# Patient Record
Sex: MALE | Race: WHITE | Employment: FULL TIME | URBAN - METROPOLITAN AREA
[De-identification: names, ages, dates, MRNs, and addresses within clinical notes are randomized per-mention and may not be internally consistent; named-entity substitution may affect disease eponyms.]

---

## 2022-08-15 ENCOUNTER — HOSPITAL ENCOUNTER (EMERGENCY)
Age: 22
Discharge: HOME | End: 2022-08-15
Attending: EMERGENCY MEDICINE
Payer: COMMERCIAL

## 2022-08-15 VITALS
HEART RATE: 61 BPM | OXYGEN SATURATION: 100 % | RESPIRATION RATE: 18 BRPM | TEMPERATURE: 98 F | WEIGHT: 145 LBS | DIASTOLIC BLOOD PRESSURE: 62 MMHG | SYSTOLIC BLOOD PRESSURE: 110 MMHG

## 2022-08-15 DIAGNOSIS — T63.441A BEE STING REACTION, ACCIDENTAL OR UNINTENTIONAL, INITIAL ENCOUNTER: Primary | ICD-10-CM

## 2022-08-15 PROCEDURE — 6370000000 HC RX 637 (ALT 250 FOR IP): Performed by: EMERGENCY MEDICINE

## 2022-08-15 PROCEDURE — 99284 EMERGENCY DEPT VISIT MOD MDM: CPT

## 2022-08-15 PROCEDURE — 93005 ELECTROCARDIOGRAM TRACING: CPT

## 2022-08-15 PROCEDURE — 6360000002 HC RX W HCPCS: Performed by: EMERGENCY MEDICINE

## 2022-08-15 PROCEDURE — 96372 THER/PROPH/DIAG INJ SC/IM: CPT

## 2022-08-15 RX ORDER — DIPHENHYDRAMINE HYDROCHLORIDE 50 MG/ML
50 INJECTION INTRAMUSCULAR; INTRAVENOUS ONCE
Status: COMPLETED | OUTPATIENT
Start: 2022-08-15 | End: 2022-08-15

## 2022-08-15 RX ORDER — DEXAMETHASONE 4 MG/1
8 TABLET ORAL ONCE
Status: COMPLETED | OUTPATIENT
Start: 2022-08-15 | End: 2022-08-15

## 2022-08-15 RX ORDER — FAMOTIDINE 20 MG/1
20 TABLET, FILM COATED ORAL ONCE
Status: COMPLETED | OUTPATIENT
Start: 2022-08-15 | End: 2022-08-15

## 2022-08-15 RX ORDER — DIPHENHYDRAMINE HCL 25 MG
25 CAPSULE ORAL EVERY 6 HOURS PRN
Qty: 20 CAPSULE | Refills: 0 | Status: SHIPPED | OUTPATIENT
Start: 2022-08-15

## 2022-08-15 RX ADMIN — FAMOTIDINE 20 MG: 20 TABLET, FILM COATED ORAL at 11:52

## 2022-08-15 RX ADMIN — DIPHENHYDRAMINE HYDROCHLORIDE 50 MG: 50 INJECTION INTRAMUSCULAR; INTRAVENOUS at 11:52

## 2022-08-15 RX ADMIN — DEXAMETHASONE 8 MG: 4 TABLET ORAL at 11:52

## 2022-08-15 ASSESSMENT — ENCOUNTER SYMPTOMS
SORE THROAT: 0
VOICE CHANGE: 0
COUGH: 0
EYE PAIN: 0
DIARRHEA: 0
STRIDOR: 0
BACK PAIN: 0
CONSTIPATION: 0
TROUBLE SWALLOWING: 0
CHOKING: 0
COLOR CHANGE: 1
FACIAL SWELLING: 0
ABDOMINAL PAIN: 0
VOMITING: 0
WHEEZING: 0
CHEST TIGHTNESS: 0
EYE REDNESS: 0
SHORTNESS OF BREATH: 0
EYE DISCHARGE: 0
SINUS PRESSURE: 0
BLOOD IN STOOL: 0

## 2022-08-15 NOTE — ED TRIAGE NOTES
Complains of a wasp sting to right hand about an hour ago. Complains of chest pressure. Resp even and unlabored. Skin PWD. Localized reaction to right index finger.

## 2022-08-15 NOTE — Clinical Note
Poncho Hines was seen and treated in our emergency department on 8/15/2022. He may return to work on 08/17/2022. If you have any questions or concerns, please don't hesitate to call.       Marci Bojorquez MD

## 2022-08-15 NOTE — ED PROVIDER NOTES
[facility] ED  eMERGENCY dEPARTMENT eNCOUnter      Pt Name: Madelyne Habermann  MRN: [id number]  [place] 2000  Date of evaluation: 8/15/2022  Provider: Ryan Chino MD    CHIEF COMPLAINT       Chief Complaint   Patient presents with    Insect Bite     STORY OF PRESENT ILLNESS   (Location/Symptom, Timing/Onset,Context/Setting, Quality, Duration, Modifying Factors, Severity)  Note limiting factors. Madelyne Habermann is a 24 y.o. male who presents to the emergency department patient bitten by a bee to the right ring finger 40 minutes ago patient has no anaphylactic reaction at this time compared to last time and required EpiPen no swelling or tongue no rash elsewhere some swelling in the right index finger    HPI    NursingNotes were reviewed. REVIEW OF SYSTEMS    (2-9 systems for level 4, 10 or more for level 5)     Review of Systems   Constitutional: Negative. Negative for activity change and fever. HENT:  Negative for congestion, drooling, facial swelling, mouth sores, nosebleeds, sinus pressure, sore throat, trouble swallowing and voice change. Eyes:  Negative for pain, discharge, redness and visual disturbance. Respiratory:  Negative for cough, choking, chest tightness, shortness of breath, wheezing and stridor. Cardiovascular:  Negative for chest pain, palpitations and leg swelling. Gastrointestinal:  Negative for abdominal pain, blood in stool, constipation, diarrhea and vomiting. Endocrine: Negative for cold intolerance, polyphagia and polyuria. Genitourinary:  Negative for dysuria, flank pain, frequency, genital sores and urgency. Musculoskeletal:  Negative for back pain, joint swelling, neck pain and neck stiffness. Skin:  Positive for color change and rash. Negative for pallor. Neurological:  Negative for tremors, seizures, syncope, weakness, numbness and headaches. Hematological:  Negative for adenopathy. Does not bruise/bleed easily.    Psychiatric/Behavioral: Negative for agitation, behavioral problems, hallucinations and sleep disturbance. The patient is not hyperactive. All other systems reviewed and are negative. Except as noted above the remainder of the review of systems was reviewed and negative. PAST MEDICAL HISTORY     Past Medical History:   Diagnosis Date    Bee sting allergy     Crohn disease ([address]          SURGICALHISTORY     History reviewed. No pertinent surgical history. CURRENT MEDICATIONS       Previous Medications    No medications on file       ALLERGIES     Bee venom    FAMILY HISTORY     History reviewed. No pertinent family history. SOCIAL HISTORY       Social History     Socioeconomic History    Marital status: Single     Spouse name: None    Number of children: None    Years of education: None    Highest education level: None   Tobacco Use    Smoking status: Never   Substance and Sexual Activity    Alcohol use: Not Currently    Drug use: Not Currently       SCREENINGS    Jada Coma Scale  Eye Opening: Spontaneous  Best Verbal Response: Oriented  Best Motor Response: Obeys commands  Jada Coma Scale Score: 15 @FLOW([id number])@      PHYSICAL EXAM    (up to 7 for level 4, 8 or more for level 5)     ED Triage Vitals [08/15/22 1144]   BP Temp Temp Source Heart Rate Resp SpO2 Height Weight   132/81 98 °F (36.7 °C) Oral 69 18 100 % -- 145 lb (65.8 kg)       Physical Exam  Vitals and nursing note reviewed. Constitutional:       General: He is not in acute distress. Appearance: Normal appearance. He is well-developed. He is not ill-appearing, toxic-appearing or diaphoretic. Comments:  cooperative patient talks in full sentences no acute distress ambulatory otherwise   HENT:      Head: Normocephalic. Right Ear: Tympanic membrane, ear canal and external ear normal.      Left Ear: Tympanic membrane, ear canal and external ear normal.      Nose: Rhinorrhea present. No congestion.       Mouth/Throat:      Mouth: Mucous membranes are moist.   Eyes:      Extraocular Movements: Extraocular movements intact. Pupils: Pupils are equal, round, and reactive to light. Neck:      Vascular: No carotid bruit. Cardiovascular:      Rate and Rhythm: Normal rate and regular rhythm. Pulses: Normal pulses. Heart sounds: Normal heart sounds. No murmur heard. No gallop. Pulmonary:      Effort: No respiratory distress. Breath sounds: Normal breath sounds. No stridor. No wheezing or rhonchi. Abdominal:      General: Bowel sounds are normal. There is no distension. Palpations: Abdomen is soft. There is no mass. Tenderness: There is no abdominal tenderness. There is no guarding or rebound. Hernia: No hernia is present. Musculoskeletal:         General: Swelling and tenderness present. Normal range of motion. Cervical back: Neck supple. No rigidity or tenderness. Lymphadenopathy:      Cervical: No cervical adenopathy. Skin:     General: Skin is warm. Capillary Refill: Capillary refill takes less than 2 seconds. Coloration: Skin is not jaundiced. Findings: Erythema and rash present. No bruising or lesion. Comments: Attention given to the right index finger patient has distal phalanx on the dorsum injured bite with mild erythema good range of motion no necrotic area cap refill less than 2-second distal phalanx no other rash noted   Neurological:      Mental Status: He is alert and oriented to person, place, and time. Cranial Nerves: No cranial nerve deficit. Sensory: No sensory deficit. Motor: No abnormal muscle tone. Coordination: Coordination normal.      Gait: Gait normal.   Psychiatric:         Behavior: Behavior normal.         Thought Content:  Thought content normal.       DIAGNOSTIC RESULTS     EKG: All EKG's are interpreted by the Emergency Department Physician who either signs or Co-signsthis chart in the absence of a cardiologist.        RADIOLOGY: Non-plain filmimages such as CT, Ultrasound and MRI are read by the radiologist. Plain radiographic images are visualized and preliminarily interpreted by the emergency physician with the below findings:        Interpretation per the Radiologist below, if available at the time ofthis note:    No orders to display         ED BEDSIDE ULTRASOUND:   Performed by ED Physician - none    LABS:  Labs Reviewed - No data to display    All other labs were within normal range or not returned as of this dictation. EMERGENCY DEPARTMENT COURSE and DIFFERENTIAL DIAGNOSIS/MDM:   Vitals:    Vitals:    08/15/22 1144   BP: 132/81   Pulse: 69   Resp: 18   Temp: 98 °F (36.7 °C)   TempSrc: Oral   SpO2: 100%   Weight: 145 lb (65.8 kg)       MDM  Number of Diagnoses or Management Options  Bee sting reaction, accidental or unintentional, initial encounter: established and improving  Diagnosis management comments: Patient had a history of an athletic rash in the past at this time patient has a rash localized to the right index finger patient is observed in the emergency for more than half an hour time no spread of the rash feeling better at this time neurovascular intact EKG performed initially ordered by the nurse because of the discomfort of the chest sinus bradycardia rate of 58/min. Intervals 126/min. Duration 96/min QT interval 382 ms P seen ischemia no ST elevation EKG        CRITICAL CARE TIME   Total Critical Care time was  minutes, excluding separately reportableprocedures. There was a high probability of clinicallysignificant/life threatening deterioration in the patient's condition which required my urgent intervention. NSULTS:  None    PROCEDURES:  Unless otherwise noted below, none     Procedures    FINAL IMPRESSION      1.  Bee sting reaction, accidental or unintentional, initial encounter          DISPOSITION/PLAN   DISPOSITION Discharge - Pending Orders Complete 08/15/2022 12:04:44 PM      PATIENT REFERRED TO:  Micheal Lyle Carm[address] [facility]  [phone]    As needed      DISCHARGE MEDICATIONS:  New Prescriptions    DIPHENHYDRAMINE (BENADRYL) 25 MG CAPSULE    Take 1 capsule by mouth every 6 hours as needed for Itching or Allergies    HYDROCORTISONE 1 % CREAM    Apply topically 2 -3 times daily for 5 - 7 days.           (Please note that portions of this note were completed with a voice recognition program.  Efforts were made to edit the dictations but occasionally words are mis-transcribed.)    Tushar Perez MD (electronically signed)  Attending Emergency Physician        Tushar Perez MD  08/15/22 24 [id number]

## 2022-08-17 LAB
EKG ATRIAL RATE: 58 BPM
EKG P AXIS: 12 DEGREES
EKG P-R INTERVAL: 126 MS
EKG Q-T INTERVAL: 382 MS
EKG QRS DURATION: 96 MS
EKG QTC CALCULATION (BAZETT): 374 MS
EKG R AXIS: 86 DEGREES
EKG T AXIS: 77 DEGREES
EKG VENTRICULAR RATE: 58 BPM

## 2023-11-02 PROBLEM — Z86.19 HISTORY OF CMV: Status: ACTIVE | Noted: 2023-11-02

## 2023-11-02 PROBLEM — K50.90 CROHN'S DISEASE (MULTI): Status: ACTIVE | Noted: 2023-11-02

## 2023-11-02 PROBLEM — R19.7 DIARRHEA: Status: ACTIVE | Noted: 2023-11-02

## 2023-11-02 PROBLEM — R63.4 WEIGHT LOSS: Status: ACTIVE | Noted: 2023-11-02

## 2023-11-02 PROBLEM — R04.0 LEFT-SIDED EPISTAXIS: Status: ACTIVE | Noted: 2023-11-02

## 2023-11-02 PROBLEM — R10.84 GENERALIZED ABDOMINAL PAIN: Status: ACTIVE | Noted: 2023-11-02

## 2023-11-02 RX ORDER — BUDESONIDE 3 MG/1
CAPSULE, COATED PELLETS ORAL
COMMUNITY

## 2024-10-01 ENCOUNTER — OFFICE VISIT (OUTPATIENT)
Dept: URGENT CARE | Age: 24
End: 2024-10-01
Payer: COMMERCIAL

## 2024-10-01 VITALS
RESPIRATION RATE: 16 BRPM | TEMPERATURE: 98.2 F | SYSTOLIC BLOOD PRESSURE: 134 MMHG | WEIGHT: 150 LBS | HEIGHT: 73 IN | BODY MASS INDEX: 19.88 KG/M2 | HEART RATE: 68 BPM | OXYGEN SATURATION: 97 % | DIASTOLIC BLOOD PRESSURE: 87 MMHG

## 2024-10-01 DIAGNOSIS — Z23 ENCOUNTER FOR IMMUNIZATION: ICD-10-CM

## 2024-10-01 DIAGNOSIS — S61.412A LACERATION OF LEFT HAND WITHOUT FOREIGN BODY, INITIAL ENCOUNTER: Primary | ICD-10-CM

## 2024-10-01 RX ORDER — CEPHALEXIN 500 MG/1
500 CAPSULE ORAL 2 TIMES DAILY
Qty: 14 CAPSULE | Refills: 0 | Status: SHIPPED | OUTPATIENT
Start: 2024-10-01 | End: 2024-10-08

## 2024-10-01 ASSESSMENT — PATIENT HEALTH QUESTIONNAIRE - PHQ9
SUM OF ALL RESPONSES TO PHQ9 QUESTIONS 1 AND 2: 0
2. FEELING DOWN, DEPRESSED OR HOPELESS: NOT AT ALL
1. LITTLE INTEREST OR PLEASURE IN DOING THINGS: NOT AT ALL

## 2024-10-01 ASSESSMENT — ENCOUNTER SYMPTOMS: WOUND: 1

## 2024-10-01 NOTE — PATIENT INSTRUCTIONS
- Discussed wound care  - Advised on s/s to seek emergent care for  - Oral antibiotics started; please make sure to complete all of the antibiotics  - Keep wound clean and dry; when steri-strips starts to come off, clip edges only

## 2024-10-01 NOTE — PROGRESS NOTES
"Subjective   Patient ID: Jairon nSider is a 24 y.o. male. They present today with a chief complaint of Injury (Cut left hand x1day).    History of Present Illness  Pt here today secondary to laceration to the fat pad of the left hand. Injury occurred last night. Cut on a piece of metal. Tetanus is not up to date. No bleeding. Neurovasc intact. Denies any other injury to the hand at this time. States only cleaned it off with water after it happened and then wrapped it over night. No other associated symptoms or concerns to address.       Injury      Past Medical History  Allergies as of 10/01/2024    (No Known Allergies)       (Not in a hospital admission)       Past Medical History:   Diagnosis Date    Underweight 06/12/2013    Underweight    Unspecified injury of unspecified wrist, hand and finger(s), initial encounter 09/13/2013    Wrist injury       Past Surgical History:   Procedure Laterality Date    OTHER SURGICAL HISTORY  10/14/2020    Colonoscopy complete for polypectomy        reports that he has never smoked. He has never used smokeless tobacco.    Review of Systems  Review of Systems   Skin:  Positive for wound.   10 point ROS completed and all are negative other than what is stated in the current HPI                                 Objective    Vitals:    10/01/24 1205   BP: 134/87   Pulse: 68   Resp: 16   Temp: 36.8 °C (98.2 °F)   SpO2: 97%   Weight: 68 kg (150 lb)   Height: 1.854 m (6' 1\")     No LMP for male patient.    Physical Exam  Constitutional:       Appearance: Normal appearance.   Skin:     General: Skin is warm and dry.      Findings: Erythema and laceration present.             Comments: 2.54 cm laceration to the fat pad of the left hand; erythema and edema noted to the area; no bleeding; can move thumb with no issues; normal sensation to hand; pain on palpation of the area. No other abnormalities noted.    Neurological:      Mental Status: He is alert.         Laceration " Repair    Date/Time: 10/1/2024 1:25 PM    Performed by: ALICIA Desai  Authorized by: ALICIA Desai    Consent:     Consent obtained:  Verbal    Risks discussed:  Infection, pain, poor cosmetic result and poor wound healing  Universal protocol:     Procedure explained and questions answered to patient or proxy's satisfaction: yes      Relevant documents present and verified: yes      Patient identity confirmed:  Verbally with patient  Anesthesia:     Anesthesia method:  None  Laceration details:     Location:  Hand    Hand location:  L palm    Length (cm):  2.5  Pre-procedure details:     Preparation:  Patient was prepped and draped in usual sterile fashion  Exploration:     Limited defect created (wound extended): no      Contaminated: yes    Treatment:     Area cleansed with:  Saline and Shur-Clens    Amount of cleaning:  Standard    Irrigation solution:  Sterile saline    Visualized foreign bodies/material removed: no      Debridement:  None  Skin repair:     Repair method:  Steri-Strips    Number of Steri-Strips:  3  Approximation:     Approximation:  Close  Repair type:     Repair type:  Simple  Post-procedure details:     Dressing:  Non-adherent dressing and bulky dressing    Procedure completion:  Tolerated well, no immediate complications      Point of Care Test & Imaging Results from this visit  No results found for this visit on 10/01/24.   No results found.    Diagnostic study results (if any) were reviewed by ALICIA Desai.    Assessment/Plan   Allergies, medications, history, and pertinent labs/EKGs/Imaging reviewed by ALICIA Desai.     Medical Decision Making      Orders and Diagnoses  Diagnoses and all orders for this visit:  Laceration of left hand without foreign body, initial encounter  -     cephalexin (Keflex) 500 mg capsule; Take 1 capsule (500 mg) by mouth 2 times a day for 7 days.  Encounter for immunization  Other orders  -      Tdap vaccine, age 7 years and older  (BOOSTRIX)  -     Laceration Repair      Medical Admin Record      Patient disposition: Home    Electronically signed by WAGNER Desai-ISADORA  1:27 PM

## 2024-11-04 ENCOUNTER — OFFICE VISIT (OUTPATIENT)
Dept: ORTHOPEDIC SURGERY | Facility: CLINIC | Age: 24
End: 2024-11-04
Payer: COMMERCIAL

## 2024-11-04 ENCOUNTER — HOSPITAL ENCOUNTER (OUTPATIENT)
Dept: RADIOLOGY | Facility: CLINIC | Age: 24
Discharge: HOME | End: 2024-11-04
Payer: COMMERCIAL

## 2024-11-04 VITALS — WEIGHT: 155 LBS | BODY MASS INDEX: 20.54 KG/M2 | HEIGHT: 73 IN

## 2024-11-04 DIAGNOSIS — S54.02XA NEUROPRAXIA OF LEFT ULNAR NERVE, INITIAL ENCOUNTER: Primary | ICD-10-CM

## 2024-11-04 DIAGNOSIS — S53.402A ELBOW SPRAIN, LEFT, INITIAL ENCOUNTER: ICD-10-CM

## 2024-11-04 DIAGNOSIS — M25.522 LEFT ELBOW PAIN: ICD-10-CM

## 2024-11-04 PROCEDURE — 99214 OFFICE O/P EST MOD 30 MIN: CPT | Performed by: STUDENT IN AN ORGANIZED HEALTH CARE EDUCATION/TRAINING PROGRAM

## 2024-11-04 PROCEDURE — 99204 OFFICE O/P NEW MOD 45 MIN: CPT | Performed by: STUDENT IN AN ORGANIZED HEALTH CARE EDUCATION/TRAINING PROGRAM

## 2024-11-04 PROCEDURE — 73080 X-RAY EXAM OF ELBOW: CPT | Mod: LEFT SIDE | Performed by: STUDENT IN AN ORGANIZED HEALTH CARE EDUCATION/TRAINING PROGRAM

## 2024-11-04 PROCEDURE — 1036F TOBACCO NON-USER: CPT | Performed by: STUDENT IN AN ORGANIZED HEALTH CARE EDUCATION/TRAINING PROGRAM

## 2024-11-04 PROCEDURE — 73080 X-RAY EXAM OF ELBOW: CPT | Mod: LT

## 2024-11-04 PROCEDURE — 3008F BODY MASS INDEX DOCD: CPT | Performed by: STUDENT IN AN ORGANIZED HEALTH CARE EDUCATION/TRAINING PROGRAM

## 2024-11-04 RX ORDER — METHYLPREDNISOLONE 4 MG/1
TABLET ORAL
Qty: 21 TABLET | Refills: 0 | Status: SHIPPED | OUTPATIENT
Start: 2024-11-04

## 2024-11-04 ASSESSMENT — PATIENT HEALTH QUESTIONNAIRE - PHQ9
2. FEELING DOWN, DEPRESSED OR HOPELESS: NOT AT ALL
1. LITTLE INTEREST OR PLEASURE IN DOING THINGS: NOT AT ALL
SUM OF ALL RESPONSES TO PHQ9 QUESTIONS 1 AND 2: 0

## 2024-11-04 NOTE — PROGRESS NOTES
Acute Injury New Patient Visit    HPI: Jairon is a 24 y.o.male who presents today with new complaints of left elbow pain.  He is right-hand dominant.  He was working on his car earlier today, and felt a pop.  He felt like a rubber band snapping.  It was a sharp pain.  Now all of his fingers are numb.  This occurred just 2 hours ago.  He was torquing on a breaker bar.    Plan: For this left elbow sprain/extensor strain, and possible ulnar nerve neuropraxia, we will place him in a sling, start him on a Medrol Dosepak, have him light duty for work as a , and follow-up in approximately 4 days for reevaluation.  If he is not better at that time, I would consider an MRI versus an EMG.    Assessment:   Problem List Items Addressed This Visit    None  Visit Diagnoses       Neuropraxia of left ulnar nerve, initial encounter    -  Primary    Relevant Medications    methylPREDNISolone (Medrol Dospak) 4 mg tablets    Left elbow pain        Relevant Orders    XR elbow left 3+ views    Elbow sprain, left, initial encounter        Relevant Medications    methylPREDNISolone (Medrol Dospak) 4 mg tablets            Diagnostics: Reviewed all relevant imaging including x-ray, MRI, CT, and US.      Procedure:  Procedures    Physical Exam:  GENERAL:  No obvious acute distress.  NEURO:  Distally neurovascularly intact.  Sensation intact to light touch.  Extremity: Exam of the left elbow:  Skin is intact with no signs of infection;  No swelling or bruising;  No erythema or warmth;  TENDER over the proximal extensor muscles;  Has weak  strength;  Negative Tinel's over the cubital tunnel;  Has difficulty with extension at the wrist without resistance;  No tenderness overlying the lateral epicondyle;  No tenderness overlying the medial epicondyle;  PAINFUL with resisted extension at the wrist;  No pain with supination;  No pain with flexion;  Negative hook test;    Orders Placed This Encounter    XR elbow left 3+ views     methylPREDNISolone (Medrol Dospak) 4 mg tablets      At the conclusion of the visit there were no further questions by the patient/family regarding their plan of care.  Patient was instructed to call or return with any issues, questions, or concerns regarding their injury and/or treatment plan described above.     11/04/24 at 5:19 PM - Gilberto Harrison, DO    Office: [phone]    This note was prepared using voice recognition software.  The details of this note are correct and have been reviewed, and corrected to the best of my ability.  Some grammatical errors may persist related to the Dragon software.

## 2024-11-04 NOTE — LETTER
November 4, 2024     Patient: Jairon Snider   YOB: 2000   Date of Visit: 11/4/2024       To Whom It May Concern:    Jairon Snider was seen in my clinic on 11/4/2024 at 2:00 pm. Please excuse Jairon for his absence from work on this day to make the appointment. He may return with light duty restrictions: No use of left arm until seen after follow up visit.     If you have any questions or concerns, please don't hesitate to call.         Sincerely,         Gilberto Harrison, DO

## 2024-11-08 ENCOUNTER — OFFICE VISIT (OUTPATIENT)
Dept: ORTHOPEDIC SURGERY | Facility: CLINIC | Age: 24
End: 2024-11-08
Payer: COMMERCIAL

## 2024-11-08 DIAGNOSIS — S53.402A ELBOW SPRAIN, LEFT, INITIAL ENCOUNTER: ICD-10-CM

## 2024-11-08 DIAGNOSIS — S42.402D: ICD-10-CM

## 2024-11-08 DIAGNOSIS — S54.02XA NEUROPRAXIA OF LEFT ULNAR NERVE, INITIAL ENCOUNTER: Primary | ICD-10-CM

## 2024-11-08 NOTE — PROGRESS NOTES
Established follow-up patient Visit    HPI: Jairon is a 24 y.o.male who presents today for follow-up of his left elbow injury.  He continues to have numbness into all of his fingers, has noted some swelling into the hand, and continues to have pain over the medial aspect of the elbow.    On 11/4/2024, he presented with new complaints of left elbow pain.  He is right-hand dominant.  He was working on his car earlier today, and felt a pop.  He felt like a rubber band snapping.  It was a sharp pain.  Now all of his fingers are numb.  This occurred just 2 hours ago.  He was torquing on a breaker bar.    Plan: Today, on 11/8/2024, we will obtain a stat MRI of the left elbow to evaluate for occult fracture, place him in a posterior splint, continue with the sling, start him on naproxen, order an EMG for this ulnar nerve neuropraxia, and follow-up with results.  I will likely refer him to our hand specialist if he continues to have issues at follow-up.    On 11/4/2024, for this left elbow sprain/extensor strain, and possible ulnar nerve neuropraxia, we will place him in a sling, start him on a Medrol Dosepak, have him light duty for work as a , and follow-up in approximately 4 days for reevaluation.  If he is not better at that time, I would consider an MRI versus an EMG.    Assessment:   Problem List Items Addressed This Visit    None  Visit Diagnoses       Neuropraxia of left ulnar nerve, initial encounter    -  Primary    Relevant Orders    MR elbow left wo IV contrast    EMG & nerve conduction    Elbow sprain, left, initial encounter        Relevant Orders    MR elbow left wo IV contrast    EMG & nerve conduction    Occult fracture of elbow with routine healing, left        Relevant Orders    MR elbow left wo IV contrast    EMG & nerve conduction              Diagnostics: Reviewed all relevant imaging including x-ray, MRI, CT, and US.      Procedure:  Procedures    Physical Exam:  GENERAL:  No obvious acute  distress.  NEURO:  Distally neurovascularly intact.  Sensation intact to light touch.  Extremity: Exam of the left elbow:  Skin is intact with no signs of infection;  Mild swelling into the dorsum of the hand with no bruising;  No erythema or warmth;  TENDER over the proximal extensor muscles;  Has weak  strength;  Negative Tinel's over the cubital tunnel;  Has difficulty with extension at the wrist without resistance;  No tenderness overlying the lateral epicondyle;  TENDER today overlying the medial epicondyle;  PAINFUL with resisted extension at the wrist;  No pain with supination;  No pain with flexion;  Negative hook test;    Orders Placed This Encounter    MR elbow left wo IV contrast    EMG & nerve conduction      At the conclusion of the visit there were no further questions by the patient/family regarding their plan of care.  Patient was instructed to call or return with any issues, questions, or concerns regarding their injury and/or treatment plan described above.     11/08/24 at 12:05 PM - Gilberto Harrison DO    Office: [phone]    This note was prepared using voice recognition software.  The details of this note are correct and have been reviewed, and corrected to the best of my ability.  Some grammatical errors may persist related to the Dragon software.

## 2024-11-09 ENCOUNTER — HOSPITAL ENCOUNTER (OUTPATIENT)
Dept: RADIOLOGY | Facility: CLINIC | Age: 24
Discharge: HOME | End: 2024-11-09
Payer: COMMERCIAL

## 2024-11-09 DIAGNOSIS — S42.402D: ICD-10-CM

## 2024-11-09 DIAGNOSIS — S53.402A ELBOW SPRAIN, LEFT, INITIAL ENCOUNTER: ICD-10-CM

## 2024-11-09 DIAGNOSIS — S54.02XA NEUROPRAXIA OF LEFT ULNAR NERVE, INITIAL ENCOUNTER: ICD-10-CM

## 2024-11-09 PROCEDURE — 73221 MRI JOINT UPR EXTREM W/O DYE: CPT | Mod: LEFT SIDE | Performed by: RADIOLOGY

## 2024-11-09 PROCEDURE — 73221 MRI JOINT UPR EXTREM W/O DYE: CPT | Mod: LT

## 2024-11-12 ENCOUNTER — APPOINTMENT (OUTPATIENT)
Dept: ORTHOPEDIC SURGERY | Facility: CLINIC | Age: 24
End: 2024-11-12
Payer: COMMERCIAL

## 2024-11-12 DIAGNOSIS — S53.449A: Primary | ICD-10-CM

## 2024-11-12 DIAGNOSIS — S54.02XA NEUROPRAXIA OF LEFT ULNAR NERVE, INITIAL ENCOUNTER: ICD-10-CM

## 2024-11-12 PROCEDURE — 99213 OFFICE O/P EST LOW 20 MIN: CPT | Performed by: STUDENT IN AN ORGANIZED HEALTH CARE EDUCATION/TRAINING PROGRAM

## 2024-11-12 NOTE — PROGRESS NOTES
Established follow-up patient Visit    HPI: Jairon is a 24 y.o.male who presents today for follow-up of his left elbow injury and MRI review.  MRI shows a very mild UCL sprain.  He continues to have some numbness and tingling into his hand.  He feels stiff.  He has completed his Medrol Dosepak.  He had been in his posterior splint.  He has a friend who has asked elbow brace, which she will start wearing.  He denies any neck pain.    On 11/8/2024, he presented for follow-up of his left elbow injury.  He continues to have numbness into all of his fingers, has noted some swelling into the hand, and continues to have pain over the medial aspect of the elbow.    On 11/4/2024, he presented with new complaints of left elbow pain.  He is right-hand dominant.  He was working on his car earlier today, and felt a pop.  He felt like a rubber band snapping.  It was a sharp pain.  Now all of his fingers are numb.  This occurred just 2 hours ago.  He was torquing on a breaker bar.    Plan: Today, on 11/12/2024, we have him hold off on his EMG, but start occupational therapy, and wear his elbow brace when he is active.  We will follow-up in 3 weeks to see how he is doing.  If he is not better at that time, I would consider an EMG versus hand referral.    On 11/8/2024, we will obtain a stat MRI of the left elbow to evaluate for occult fracture, place him in a posterior splint, continue with the sling, start him on naproxen, order an EMG for this ulnar nerve neuropraxia, and follow-up with results.  I will likely refer him to our hand specialist if he continues to have issues at follow-up.    On 11/4/2024, for this left elbow sprain/extensor strain, and possible ulnar nerve neuropraxia, we will place him in a sling, start him on a Medrol Dosepak, have him light duty for work as a , and follow-up in approximately 4 days for reevaluation.  If he is not better at that time, I would consider an MRI versus an EMG.    Assessment:    Problem List Items Addressed This Visit    None  Visit Diagnoses       Sprain of ulnar collateral ligament of elbow, initial encounter    -  Primary    Relevant Orders    Referral to Occupational Therapy    Neuropraxia of left ulnar nerve, initial encounter                    Diagnostics: Reviewed all relevant imaging including x-ray, MRI, CT, and US.      Procedure:  Procedures    Physical Exam:  GENERAL:  No obvious acute distress.  NEURO:  Distally neurovascularly intact.  Sensation intact to light touch.  Extremity: Exam of the left elbow:  Skin is intact with no signs of infection;  Mild swelling into the dorsum of the hand with no bruising;  No erythema or warmth;  TENDER over the distal UCL attachment;  Has weak  strength;  Negative Tinel's over the cubital tunnel;  Has difficulty with extension at the wrist without resistance;  No tenderness overlying the lateral epicondyle;  Mildly TENDER today overlying the medial epicondyle;  Still somewhat PAINFUL with resisted extension at the wrist;  No pain with supination;  No pain with flexion;  Negative hook test;    Orders Placed This Encounter    Referral to Occupational Therapy      At the conclusion of the visit there were no further questions by the patient/family regarding their plan of care.  Patient was instructed to call or return with any issues, questions, or concerns regarding their injury and/or treatment plan described above.     11/12/24 at 12:00 PM - Gilberto Harriosn DO    Office: [phone]    This note was prepared using voice recognition software.  The details of this note are correct and have been reviewed, and corrected to the best of my ability.  Some grammatical errors may persist related to the Dragon software.

## 2024-12-05 ENCOUNTER — APPOINTMENT (OUTPATIENT)
Dept: ORTHOPEDIC SURGERY | Facility: CLINIC | Age: 24
End: 2024-12-05
Payer: COMMERCIAL